# Patient Record
Sex: FEMALE | Race: WHITE | NOT HISPANIC OR LATINO | URBAN - METROPOLITAN AREA
[De-identification: names, ages, dates, MRNs, and addresses within clinical notes are randomized per-mention and may not be internally consistent; named-entity substitution may affect disease eponyms.]

---
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
## 2022-07-07 NOTE — ED ADULT NURSE NOTE - CHIEF COMPLAINT QUOTE
Pt arrives to ED for suicidal thoughts. Pt was going to go to [facility] for a drug/alcohol rehab program. Pt states is not currently using and would commit suicide if she was made to go. Pt requested to come to ED for eval. Pt endorses SI, denies plan or previous SI/HI. Pt is calm and cooperative in triage. Endorsed to  SYDNEY Dhaliwal, pt walked to . No
